# Patient Record
Sex: FEMALE | Race: BLACK OR AFRICAN AMERICAN
[De-identification: names, ages, dates, MRNs, and addresses within clinical notes are randomized per-mention and may not be internally consistent; named-entity substitution may affect disease eponyms.]

---

## 2017-05-19 ENCOUNTER — HOSPITAL ENCOUNTER (EMERGENCY)
Dept: HOSPITAL 61 - ER | Age: 27
Discharge: HOME | End: 2017-05-19
Payer: COMMERCIAL

## 2017-05-19 VITALS — BODY MASS INDEX: 21.97 KG/M2 | WEIGHT: 140 LBS | HEIGHT: 67 IN

## 2017-05-19 VITALS — DIASTOLIC BLOOD PRESSURE: 55 MMHG | SYSTOLIC BLOOD PRESSURE: 92 MMHG

## 2017-05-19 DIAGNOSIS — J45.909: ICD-10-CM

## 2017-05-19 DIAGNOSIS — Z91.041: ICD-10-CM

## 2017-05-19 DIAGNOSIS — R51: Primary | ICD-10-CM

## 2017-05-19 DIAGNOSIS — R20.2: ICD-10-CM

## 2017-05-19 PROCEDURE — 99284 EMERGENCY DEPT VISIT MOD MDM: CPT

## 2017-05-19 PROCEDURE — 81025 URINE PREGNANCY TEST: CPT

## 2017-05-19 PROCEDURE — 96374 THER/PROPH/DIAG INJ IV PUSH: CPT

## 2017-05-19 PROCEDURE — 96375 TX/PRO/DX INJ NEW DRUG ADDON: CPT

## 2017-05-19 NOTE — PHYS DOC
Past Medical History


Past Medical History:  Asthma


Past Surgical History:  Other


Additional Past Surgical Histo:  DNC FROM [facility] [place]


Alcohol Use:  Occasionally


Drug Use:  None





Adult General


Chief Complaint


Chief Complaint:  HEADACHE





HPI


HPI





Patient is a 26  year old female who presents with gradual onset left sided 

headache that is achy and has associated tingling and pain to left shoulder, 

neck and upper extremity.  States left upper extremity feels slightly better if 

held in flexion at elbow.  Symptoms are worse when she turns her head to the 

right or bends her neck to the right.  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 

symptoms began.  Denies recent trauma.  Denies vision changes, dizziness, chest 

pain, dyspnea, cough, f/c, n/v, abdominal pain, back pain, rash.





Review of Systems


Review of Systems





Constitutional: Denies fever or chills []


Eyes: Denies change in visual acuity, redness, or eye pain []


HENT: Denies nasal congestion or sore throat []


Respiratory: Denies cough or shortness of breath []


Cardiovascular: No additional information not addressed in HPI []


GI: Denies abdominal pain, nausea, vomiting, bloody stools or diarrhea []


: Denies dysuria or hematuria []


Musculoskeletal: Denies back pain or joint pain []


Integument: Denies rash or skin lesions []


Neurologic: Denies focal weakness []


Endocrine: Denies polyuria or polydipsia []





Current Medications


Current Medications





Current Medications








 Medications


  (Trade)  Dose


 Ordered  Sig/Luke  Start Time


 Stop Time Status Last Admin


Dose Admin


 


 Dexamethasone


 Sodium Phosphate


  (Decadron)  10 mg  1X  ONCE  5/19/17 22:30


 5/19/17 22:31 DC 5/19/17 22:41


10 MG


 


 Diphenhydramine


 HCl


  (Benadryl)  25 mg  1X  ONCE  5/19/17 22:30


 5/19/17 22:31 DC 5/19/17 22:42


25 MG


 


 Ketorolac


 Tromethamine


  (Toradol)  15 mg  1X  ONCE  5/19/17 22:30


 5/19/17 22:31 DC 5/19/17 22:42


15 MG


 


 Prochlorperazine


 Edisylate


  (Compazine)  10 mg  1X  ONCE  5/19/17 22:30


 5/19/17 22:31 DC 5/19/17 22:41


10 MG











Allergies


Allergies





Allergies








Coded Allergies Type Severity Reaction Last Updated Verified


 


  radium-223 dichloride Allergy Intermediate RASH 8/29/14 Yes











Physical Exam


Physical Exam





Constitutional: Well developed, well nourished, no acute distress, non-toxic 

appearance. []


HENT: Normocephalic, atraumatic, bilateral external ears normal, oropharynx 

moist, nose normal. []


Eyes: PERRLA, EOMI. [] 


Neck: Normal range of motion, supple. [] 


Cardiovascular:Heart rate regular rhythm []


Lungs & Thorax:  Bilateral breath sounds clear to auscultation []


Abdomen: Bowel sounds normal, soft, no tenderness. [] 


Skin: Warm, dry, no erythema, no rash. [] 


Back: No spinal tenderness, normal ROM; Has some left trapezius and thoracic 

paraspinal tenderness. [] 


Extremities: No tenderness, ROM intact, no edema, 2+ radial pulses bilaterally. 

[] 


Neurologic: Alert and oriented X 3, normal motor function, normal sensory 

function, no focal deficits noted. []


Psychologic: Affect normal, judgement normal, mood normal. []





Current Patient Data


Vital Signs





 Vital Signs








  Date Time  Temp Pulse Resp B/P (MAP) Pulse Ox O2 Delivery O2 Flow Rate FiO2


 


5/19/17 22:29 98.3 119 16 126/73 (90) 98 Room Air  





 98.3       








Lab Values





 Laboratory Tests








Test


  5/19/17


21:18


 


POC Urine HCG, Qualitative


  Hcg negative


(Negative)











Course & Med Decision Making


Course & Med Decision Making


Her symptoms are resolved after medications and she feels back to baseline. She 

would like to go home at this time. Return precautions given. She understands 

and agrees with plan.





Dragon Disclaimer


Dragon Disclaimer


This electronic medical record was generated, in whole or in part, using a 

voice recognition dictation system.





Departure


Departure


Impression:  


 Primary Impression:  


 Headache


 Additional Impression:  


 Tingling of left upper extremity


Disposition:  01 HOME, SELF-CARE


Condition:  STABLE


Referrals:  


ALEX BUSTILLO MD (PCP)


Patient Instructions:  Migraine Headache, Easy-to-Read





Additional Instructions:  


Your symptoms are concerning for migraine or radiculopathy. Take Tylenol or 

ibuprofen as needed for pain. Follow-up with your primary care doctor within 

the week. Return for any concerns.





Problem Qualifiers








 Primary Impression:  


 Headache


 Headache type:  unspecified  Headache chronicity pattern:  acute headache  

Intractability:  not intractable  Qualified Codes:  R51 - Headache








ASAF MCFARLAND MD May 19, 2017 22:45

## 2017-06-02 ENCOUNTER — HOSPITAL ENCOUNTER (EMERGENCY)
Dept: HOSPITAL 61 - ER | Age: 27
Discharge: HOME | End: 2017-06-02
Payer: COMMERCIAL

## 2017-06-02 VITALS — WEIGHT: 135 LBS | HEIGHT: 67 IN | BODY MASS INDEX: 21.19 KG/M2

## 2017-06-02 VITALS — DIASTOLIC BLOOD PRESSURE: 59 MMHG | SYSTOLIC BLOOD PRESSURE: 98 MMHG

## 2017-06-02 DIAGNOSIS — Z88.8: ICD-10-CM

## 2017-06-02 DIAGNOSIS — L03.116: Primary | ICD-10-CM

## 2017-06-02 DIAGNOSIS — F17.210: ICD-10-CM

## 2017-06-02 PROCEDURE — 99283 EMERGENCY DEPT VISIT LOW MDM: CPT

## 2017-06-02 NOTE — PHYS DOC
Past Medical History


Past Medical History:  No Pertinent History


Past Surgical History:  No Surgical History


Additional Past Surgical Histo:  COMPLICATIONS WITH VAGINAL BIRTH


Additional Information:  


3 CIGS/DAY


Alcohol Use:  None


Drug Use:  None





Adult General


Chief Complaint


Chief Complaint:  FOOT INJURY PAIN





HPI


HPI





Patient is a 26  year old female presenting to the emergency department for 

evaluation of left leg foot and calf pain that has been going on since 

yesterday after a foreign went into her left Achilles area.  I can see a small 

piece of retained foreign body in her left Achilles.  There is surrounding 

redness and swelling.  She says the pain radiates up and down the leg and it is 

painful to walk.  Her Achilles is intact and she is able to walk it is just 

painful to do so.  There is some tingling around the foreign body no weakness 

numbness or tingling distally.  She says that she thinks her tetanus status is 

up-to-date.  She says that she is not diabetic and she has no immune system 

issues.  She is in no obvious distress with normal vital signs.





Review of Systems


Review of Systems





Constitutional: Denies fever or chills []


GI: Denies abdominal pain, nausea, vomiting.


Musculoskeletal: Denies back pain or joint pain []


Integument: + redness.


Neurologic: Denies headache, focal weakness or sensory changes []





Allergies


Allergies





Allergies








Coded Allergies Type Severity Reaction Last Updated Verified


 


  radium-223 dichloride Allergy Intermediate RASH 8/29/14 Yes











Physical Exam


Physical Exam





Constitutional: Well developed, well nourished, no acute distress, non-toxic 

appearance. []


Cardiovascular:Heart rate regular rhythm, no murmur []


Lungs & Thorax:  Bilateral breath sounds clear to auscultation []


Skin: Warm, dry, no erythema, no rash. [] 


Extremities: Left Achilles area with small black appearing foreign body.  There 

is surrounding cellulitis approximately 2 x 2 centimeters with pain but no 

subcutaneous air in her calf and foot or wound area.  She has pain to palpation 

in her calf and foot as well but there is no redness.  There is mild swelling 

on the top of her left foot.  She is neurovascularly intact distally.


Neurologic: Alert and oriented X 3, normal motor function, normal sensory 

function, no focal deficits noted. []





Current Patient Data


Vital Signs





 Vital Signs








  Date Time  Temp Pulse Resp B/P (MAP) Pulse Ox O2 Delivery O2 Flow Rate FiO2


 


6/2/17 11:42 98.1 80 18  99 Room Air  





 98.1       











EKG


EKG


[]





Radiology/Procedures


Radiology/Procedures


[]





Course & Med Decision Making


Course & Med Decision Making


I recommended that she let me numb up the area of foreign body and extract any 

retained foreign body but she refused.  She said she rather see if it falls out 

on its own.  I told her that I will start her on antibiotics and she needs to 

have her wound rechecked on Monday and if it gets more red and swollen or 

painful she needs to come back to the emergency department.  Patient aware and 

agreeable with plan for discharge and verbalized understanding of the need for 

short-term follow-up and strict ER return precautions discussed as above.





Dragon Disclaimer


Dragon Disclaimer


This electronic medical record was generated, in whole or in part, using a 

voice recognition dictation system.





Departure


Departure


Impression:  


 Primary Impression:  


 Cellulitis of leg without foot


Disposition:  01 HOME, SELF-CARE


Condition:  GOOD


Referrals:  


ALEX BUSTILLO MD (PCP)


Patient Instructions:  Cellulitis





Additional Instructions:  


TAKE 400MG OF IBUPROFEN EVERY 6 HOURS AND THE NORCO FOR BREAKTHROUGH PAIN.  

FOLLOW WITH YOUR PCP MONDAY AND COME BACK TO THE ED SOONER WITH ANY NEW OR 

WORSENING PAIN, FEVERS, REDNESS, SWELLING, OR OTHER GENERAL CONCERNS.  THANK YOU

!


Scripts


Cephalexin (KEFLEX) 500 Mg Capsule


1 CAP PO BID, #14 CAP


   Prov: TIFFANIE MCCLELLAN DO         6/2/17 


Hydrocodone/Apap 5-325 (NORCO 5-325 TABLET) 1 Each Tablet


1 TAB PO PRN Q6HRS Y for PAIN, #20 TAB 0 Refills


   Prov: TIFFANIE MCCLELLAN DO         6/2/17 


Sulfamethoxazole/Trimethoprim (BACTRIM 400-80 MG TABLET) 1 Each Tablet


1 TAB PO BID, #14 TAB


   Prov: TIFFANIE MCCLELLAN DO         6/2/17











TIFFANIE MCCLELLAN DO Jun 2, 2017 12:21

## 2018-01-08 ENCOUNTER — HOSPITAL ENCOUNTER (EMERGENCY)
Dept: HOSPITAL 61 - ER | Age: 28
LOS: 1 days | Discharge: HOME | End: 2018-01-09
Payer: COMMERCIAL

## 2018-01-08 DIAGNOSIS — Z87.891: ICD-10-CM

## 2018-01-08 DIAGNOSIS — Z3A.01: ICD-10-CM

## 2018-01-08 DIAGNOSIS — O23.41: Primary | ICD-10-CM

## 2018-01-08 DIAGNOSIS — Z88.8: ICD-10-CM

## 2018-01-08 PROCEDURE — 84702 CHORIONIC GONADOTROPIN TEST: CPT

## 2018-01-08 PROCEDURE — 85025 COMPLETE CBC W/AUTO DIFF WBC: CPT

## 2018-01-08 PROCEDURE — 81025 URINE PREGNANCY TEST: CPT

## 2018-01-08 PROCEDURE — 99285 EMERGENCY DEPT VISIT HI MDM: CPT

## 2018-01-08 PROCEDURE — 87086 URINE CULTURE/COLONY COUNT: CPT

## 2018-01-08 PROCEDURE — 86901 BLOOD TYPING SEROLOGIC RH(D): CPT

## 2018-01-08 PROCEDURE — 81001 URINALYSIS AUTO W/SCOPE: CPT

## 2018-01-08 PROCEDURE — 76801 OB US < 14 WKS SINGLE FETUS: CPT

## 2018-01-08 PROCEDURE — 86900 BLOOD TYPING SEROLOGIC ABO: CPT

## 2018-01-08 PROCEDURE — 36415 COLL VENOUS BLD VENIPUNCTURE: CPT

## 2018-01-09 LAB
ADD MAN DIFF?: NO
BACTERIA,URINE: (no result) /HPF
BASO #: 0 X10^3/UL (ref 0–0.2)
BASO %: 1 % (ref 0–3)
BILIRUBIN,URINE: NEGATIVE
CLARITY,URINE: (no result)
COLOR,URINE: YELLOW
EOS #: 0.1 X10^3/UL (ref 0–0.7)
EOS %: 2 % (ref 0–3)
GLUCOSE,URINE: NEGATIVE MG/DL
HCG SERPL-ACNC: 7 X10^3/UL (ref 4–11)
HEMATOCRIT: 38 % (ref 36–47)
HEMOGLOBIN: 12.1 G/DL (ref 12–15.5)
LYMPH #: 3.4 X10^3/UL (ref 1–4.8)
LYMPH %: 49 % (ref 24–48)
MEAN CORPUSCULAR HEMOGLOBIN: 28 PG (ref 25–35)
MEAN CORPUSCULAR HGB CONC: 32 G/DL (ref 31–37)
MEAN CORPUSCULAR VOLUME: 88 FL (ref 79–100)
MONO #: 0.5 X10^3/UL (ref 0–1.1)
MONO %: 7 % (ref 0–9)
NEUT #: 2.9 X10^3UL (ref 1.8–7.7)
NEUT %: 42 % (ref 31–73)
NITRITE,URINE: POSITIVE
PH,URINE: 6.5
PLATELET COUNT: 252 X10^3/UL (ref 140–400)
PROTEIN,URINE: NEGATIVE MG/DL
RBC,URINE: (no result) /HPF (ref 0–2)
RED BLOOD COUNT: 4.31 X10^6/UL (ref 3.5–5.4)
RED CELL DISTRIBUTION WIDTH: 15.2 % (ref 11.5–14.5)
SPECIFIC GRAVITY,URINE: >=1.03
SQUAMOUS EPITHELIAL CELL,UR: (no result) /LPF
URINE HCG POC: (no result)
UROBILINOGEN,URINE: 1 MG/DL
WBC,URINE: (no result) /HPF (ref 0–4)

## 2018-01-09 RX ADMIN — NITROFURANTOIN (MONOHYDRATE/MACROCRYSTALS) 1 MG: 75; 25 CAPSULE ORAL at 02:00

## 2018-01-10 ENCOUNTER — HOSPITAL ENCOUNTER (EMERGENCY)
Dept: HOSPITAL 61 - ER | Age: 28
Discharge: HOME | End: 2018-01-10
Payer: COMMERCIAL

## 2018-01-10 DIAGNOSIS — Z88.8: ICD-10-CM

## 2018-01-10 DIAGNOSIS — Z3A.22: ICD-10-CM

## 2018-01-10 DIAGNOSIS — O20.0: Primary | ICD-10-CM

## 2018-01-10 LAB
BACTERIA,URINE: (no result) /HPF
BILIRUBIN,URINE: (no result)
CLARITY,URINE: (no result)
COLOR,URINE: (no result)
GLUCOSE,URINE: NEGATIVE MG/DL
NITRITE,URINE: NEGATIVE
PH,URINE: 6
PROTEIN,URINE: 30 MG/DL
RBC,URINE: >40 /HPF (ref 0–2)
SPECIFIC GRAVITY,URINE: 1.02
SQUAMOUS EPITHELIAL CELL,UR: (no result) /LPF
UROBILINOGEN,URINE: 1 MG/DL
WBC,URINE: (no result) /HPF (ref 0–4)

## 2018-01-10 PROCEDURE — 36415 COLL VENOUS BLD VENIPUNCTURE: CPT

## 2018-01-10 PROCEDURE — 81001 URINALYSIS AUTO W/SCOPE: CPT

## 2018-01-10 PROCEDURE — 87086 URINE CULTURE/COLONY COUNT: CPT

## 2018-01-10 PROCEDURE — 84702 CHORIONIC GONADOTROPIN TEST: CPT

## 2018-01-10 PROCEDURE — 99285 EMERGENCY DEPT VISIT HI MDM: CPT

## 2018-01-10 PROCEDURE — 76801 OB US < 14 WKS SINGLE FETUS: CPT

## 2018-04-05 ENCOUNTER — HOSPITAL ENCOUNTER (EMERGENCY)
Dept: HOSPITAL 61 - ER | Age: 28
Discharge: HOME | End: 2018-04-05
Payer: COMMERCIAL

## 2018-04-05 VITALS
SYSTOLIC BLOOD PRESSURE: 108 MMHG | DIASTOLIC BLOOD PRESSURE: 78 MMHG | DIASTOLIC BLOOD PRESSURE: 78 MMHG | SYSTOLIC BLOOD PRESSURE: 108 MMHG

## 2018-04-05 DIAGNOSIS — O26.891: ICD-10-CM

## 2018-04-05 DIAGNOSIS — R11.0: ICD-10-CM

## 2018-04-05 DIAGNOSIS — O21.9: Primary | ICD-10-CM

## 2018-04-05 DIAGNOSIS — Z91.041: ICD-10-CM

## 2018-04-05 LAB
ADD MAN DIFF?: NO
ALBUMIN SERPL-MCNC: 3.1 G/DL (ref 3.4–5)
ALP SERPL-CCNC: 41 U/L (ref 46–116)
ALT (SGPT): 17 U/L (ref 14–59)
AMORPHOUS SEDIMENT,UR: PRESENT /HPF
ANION GAP SERPL CALC-SCNC: 9 MMOL/L (ref 6–14)
AST SERPL-CCNC: 16 U/L (ref 15–37)
BACTERIA,URINE: 0 /HPF
BASO #: 0 X10^3/UL (ref 0–0.2)
BASO %: 0 % (ref 0–3)
BILIRUB DIRECT SERPL-MCNC: < 0.1 MG/DL (ref 0–0.2)
BILIRUBIN,URINE: NEGATIVE
BLOOD UREA NITROGEN: 6 MG/DL (ref 7–20)
CALCIUM: 8.7 MG/DL (ref 8.5–10.1)
CHLORIDE: 103 MMOL/L (ref 98–107)
CLARITY,URINE: (no result)
CO2 SERPL-SCNC: 26 MMOL/L (ref 21–32)
COLOR,URINE: YELLOW
CREAT SERPL-MCNC: 0.5 MG/DL (ref 0.6–1)
EOS #: 0.1 X10^3/UL (ref 0–0.7)
EOS %: 1 % (ref 0–3)
GFR SERPLBLD BASED ON 1.73 SQ M-ARVRAT: 176.5 ML/MIN
GLUCOSE SERPL-MCNC: 71 MG/DL (ref 70–99)
GLUCOSE,URINE: NEGATIVE MG/DL
HCG SERPL-ACNC: 5.9 X10^3/UL (ref 4–11)
HEMATOCRIT: 29.6 % (ref 36–47)
HEMOGLOBIN: 9.9 G/DL (ref 12–15.5)
LYMPH #: 1.8 X10^3/UL (ref 1–4.8)
LYMPH %: 31 % (ref 24–48)
MEAN CORPUSCULAR HEMOGLOBIN: 28 PG (ref 25–35)
MEAN CORPUSCULAR HGB CONC: 33 G/DL (ref 31–37)
MEAN CORPUSCULAR VOLUME: 85 FL (ref 79–100)
MONO #: 0.5 X10^3/UL (ref 0–1.1)
MONO %: 8 % (ref 0–9)
NEUT #: 3.6 X10^3UL (ref 1.8–7.7)
NEUT %: 60 % (ref 31–73)
NITRITE,URINE: NEGATIVE
PH,URINE: 7.5
PLATELET COUNT: 231 X10^3/UL (ref 140–400)
POTASSIUM SERPL-SCNC: 3.6 MMOL/L (ref 3.5–5.1)
PROTEIN,URINE: NEGATIVE MG/DL
RBC,URINE: 0 /HPF (ref 0–2)
RED BLOOD COUNT: 3.5 X10^6/UL (ref 3.5–5.4)
RED CELL DISTRIBUTION WIDTH: 16.2 % (ref 11.5–14.5)
SODIUM: 138 MMOL/L (ref 136–145)
SPECIFIC GRAVITY,URINE: 1.02
SQUAMOUS EPITHELIAL CELL,UR: (no result) /LPF
TOTAL BILIRUBIN: 0.4 MG/DL (ref 0.2–1)
TOTAL PROTEIN: 6.9 G/DL (ref 6.4–8.2)
URINE HCG POC: (no result)
UROBILINOGEN,URINE: 1 MG/DL
WBC,URINE: (no result) /HPF (ref 0–4)

## 2018-04-05 PROCEDURE — 76801 OB US < 14 WKS SINGLE FETUS: CPT

## 2018-04-05 PROCEDURE — 81001 URINALYSIS AUTO W/SCOPE: CPT

## 2018-04-05 PROCEDURE — 96361 HYDRATE IV INFUSION ADD-ON: CPT

## 2018-04-05 PROCEDURE — 36415 COLL VENOUS BLD VENIPUNCTURE: CPT

## 2018-04-05 PROCEDURE — 85025 COMPLETE CBC W/AUTO DIFF WBC: CPT

## 2018-04-05 PROCEDURE — 81025 URINE PREGNANCY TEST: CPT

## 2018-04-05 PROCEDURE — 80048 BASIC METABOLIC PNL TOTAL CA: CPT

## 2018-04-05 PROCEDURE — 80076 HEPATIC FUNCTION PANEL: CPT

## 2018-04-05 PROCEDURE — 96374 THER/PROPH/DIAG INJ IV PUSH: CPT

## 2018-04-05 PROCEDURE — 99285 EMERGENCY DEPT VISIT HI MDM: CPT

## 2018-04-05 RX ADMIN — ONDANSETRON 1 MG: 2 INJECTION INTRAMUSCULAR; INTRAVENOUS at 14:45

## 2018-04-05 RX ADMIN — BACITRACIN 1 MLS/HR: 5000 INJECTION, POWDER, FOR SOLUTION INTRAMUSCULAR at 14:30

## 2018-07-06 ENCOUNTER — HOSPITAL ENCOUNTER (OUTPATIENT)
Dept: HOSPITAL 61 - 3 SO LND | Age: 28
Setting detail: OBSERVATION
Discharge: HOME | End: 2018-07-06
Attending: SPECIALIST | Admitting: SPECIALIST
Payer: COMMERCIAL

## 2018-07-06 DIAGNOSIS — Z79.899: ICD-10-CM

## 2018-07-06 DIAGNOSIS — Z3A.25: ICD-10-CM

## 2018-07-06 LAB
ADD MAN DIFF?: NO
ALBUMIN SERPL-MCNC: 3 G/DL (ref 3.4–5)
ALBUMIN/GLOB SERPL: 0.8 {RATIO} (ref 1–1.7)
ALP SERPL-CCNC: 85 U/L (ref 46–116)
ALT (SGPT): 15 U/L (ref 14–59)
ANION GAP SERPL CALC-SCNC: 12 MMOL/L (ref 6–14)
AST SERPL-CCNC: 19 U/L (ref 15–37)
BACTERIA,URINE: (no result) /HPF
BASO #: 0 X10^3/UL (ref 0–0.2)
BASO %: 1 % (ref 0–3)
BILIRUBIN,URINE: (no result)
BLOOD UREA NITROGEN: 7 MG/DL (ref 7–20)
BUN/CREAT SERPL: 18 (ref 6–20)
CALCIUM: 8.2 MG/DL (ref 8.5–10.1)
CHLORIDE: 101 MMOL/L (ref 98–107)
CLARITY,URINE: (no result)
CO2 SERPL-SCNC: 23 MMOL/L (ref 21–32)
COLOR,URINE: (no result)
CREAT SERPL-MCNC: 0.4 MG/DL (ref 0.6–1)
EOS #: 0.2 X10^3/UL (ref 0–0.7)
EOS %: 3 % (ref 0–3)
GFR SERPLBLD BASED ON 1.73 SQ M-ARVRAT: 228.3 ML/MIN
GLOBULIN SER-MCNC: 3.6 G/DL (ref 2.2–3.8)
GLUCOSE SERPL-MCNC: 67 MG/DL (ref 70–99)
GLUCOSE,URINE: NEGATIVE MG/DL
HCG SERPL-ACNC: 6.5 X10^3/UL (ref 4–11)
HEMATOCRIT: 29.4 % (ref 36–47)
HEMOGLOBIN: 9.7 G/DL (ref 12–15.5)
LYMPH #: 1.7 X10^3/UL (ref 1–4.8)
LYMPH %: 25 % (ref 24–48)
MEAN CORPUSCULAR HEMOGLOBIN: 28 PG (ref 25–35)
MEAN CORPUSCULAR HGB CONC: 33 G/DL (ref 31–37)
MEAN CORPUSCULAR VOLUME: 83 FL (ref 79–100)
MONO #: 0.6 X10^3/UL (ref 0–1.1)
MONO %: 9 % (ref 0–9)
NEUT #: 4 X10^3UL (ref 1.8–7.7)
NEUT %: 62 % (ref 31–73)
NITRITE,URINE: NEGATIVE
PH,URINE: 6.5
PLATELET COUNT: 190 X10^3/UL (ref 140–400)
POTASSIUM SERPL-SCNC: 3.5 MMOL/L (ref 3.5–5.1)
PROTEIN,URINE: 30 MG/DL
RBC,URINE: (no result) /HPF (ref 0–2)
RED BLOOD COUNT: 3.53 X10^6/UL (ref 3.5–5.4)
RED CELL DISTRIBUTION WIDTH: 15.7 % (ref 11.5–14.5)
SODIUM: 136 MMOL/L (ref 136–145)
SPECIFIC GRAVITY,URINE: 1.02
SQUAMOUS EPITHELIAL CELL,UR: (no result) /LPF
TOTAL BILIRUBIN: 0.3 MG/DL (ref 0.2–1)
TOTAL PROTEIN: 6.6 G/DL (ref 6.4–8.2)
UROBILINOGEN,URINE: 1 MG/DL
WBC,URINE: (no result) /HPF (ref 0–4)

## 2018-07-06 PROCEDURE — 81001 URINALYSIS AUTO W/SCOPE: CPT

## 2018-07-06 PROCEDURE — 80053 COMPREHEN METABOLIC PANEL: CPT

## 2018-07-06 PROCEDURE — 85025 COMPLETE CBC W/AUTO DIFF WBC: CPT

## 2018-07-06 PROCEDURE — 96360 HYDRATION IV INFUSION INIT: CPT

## 2018-07-06 PROCEDURE — 36415 COLL VENOUS BLD VENIPUNCTURE: CPT

## 2018-07-06 PROCEDURE — 96361 HYDRATE IV INFUSION ADD-ON: CPT

## 2018-07-06 PROCEDURE — 87086 URINE CULTURE/COLONY COUNT: CPT

## 2018-07-06 RX ADMIN — DEXTROSE, SODIUM CHLORIDE, SODIUM LACTATE, POTASSIUM CHLORIDE, AND CALCIUM CHLORIDE 1 MLS/HR: 5; .6; .31; .03; .02 INJECTION, SOLUTION INTRAVENOUS at 19:26

## 2018-07-06 RX ADMIN — DEXTROSE, SODIUM CHLORIDE, SODIUM LACTATE, POTASSIUM CHLORIDE, AND CALCIUM CHLORIDE 1 MLS/HR: 5; .6; .31; .03; .02 INJECTION, SOLUTION INTRAVENOUS at 18:26

## 2018-07-21 ENCOUNTER — HOSPITAL ENCOUNTER (OUTPATIENT)
Dept: HOSPITAL 61 - 3 SO LND | Age: 28
Setting detail: OBSERVATION
Discharge: HOME | End: 2018-07-21
Attending: SPECIALIST | Admitting: SPECIALIST
Payer: COMMERCIAL

## 2018-07-21 DIAGNOSIS — R10.9: ICD-10-CM

## 2018-07-21 DIAGNOSIS — O26.892: Primary | ICD-10-CM

## 2018-07-21 DIAGNOSIS — Z3A.27: ICD-10-CM

## 2018-07-21 LAB
AMORPHOUS SEDIMENT,UR: PRESENT /HPF
BACTERIA,URINE: 0 /HPF
BILIRUBIN,URINE: NEGATIVE
CLARITY,URINE: (no result)
COLOR,URINE: YELLOW
GLUCOSE,URINE: NEGATIVE MG/DL
NITRITE,URINE: NEGATIVE
PH,URINE: 8
PROTEIN,URINE: NEGATIVE MG/DL
RBC,URINE: 0 /HPF (ref 0–2)
SPECIFIC GRAVITY,URINE: 1.01
SQUAMOUS EPITHELIAL CELL,UR: (no result) /LPF
UROBILINOGEN,URINE: 1 MG/DL
WBC,URINE: 0 /HPF (ref 0–4)

## 2018-07-21 PROCEDURE — 96361 HYDRATE IV INFUSION ADD-ON: CPT

## 2018-07-21 PROCEDURE — 96360 HYDRATION IV INFUSION INIT: CPT

## 2018-07-21 PROCEDURE — 59025 FETAL NON-STRESS TEST: CPT

## 2018-07-21 PROCEDURE — 81001 URINALYSIS AUTO W/SCOPE: CPT

## 2018-07-21 PROCEDURE — 87086 URINE CULTURE/COLONY COUNT: CPT

## 2018-07-21 RX ADMIN — DEXTROSE, SODIUM CHLORIDE, SODIUM LACTATE, POTASSIUM CHLORIDE, AND CALCIUM CHLORIDE 1 MLS/HR: 5; .6; .31; .03; .02 INJECTION, SOLUTION INTRAVENOUS at 09:54

## 2018-07-21 RX ADMIN — DEXTROSE, SODIUM CHLORIDE, SODIUM LACTATE, POTASSIUM CHLORIDE, AND CALCIUM CHLORIDE 1 MLS/HR: 5; .6; .31; .03; .02 INJECTION, SOLUTION INTRAVENOUS at 08:54

## 2018-08-10 ENCOUNTER — HOSPITAL ENCOUNTER (OUTPATIENT)
Dept: HOSPITAL 61 - 3 SO LND | Age: 28
Setting detail: OBSERVATION
Discharge: HOME | End: 2018-08-10
Attending: SPECIALIST | Admitting: SPECIALIST
Payer: COMMERCIAL

## 2018-08-10 DIAGNOSIS — Z3A.30: ICD-10-CM

## 2018-08-10 DIAGNOSIS — Z79.899: ICD-10-CM

## 2018-08-10 LAB
AMNIO PT: NEGATIVE
AMPHETAMINE/METHAMPHETAMINE: (no result)
APTT PPP: YELLOW S
BACTERIA #/AREA URNS HPF: 0 /HPF
BARBITURATES UR-MCNC: (no result) UG/ML
BENZODIAZ UR-MCNC: (no result) UG/L
BILIRUB UR QL STRIP: NEGATIVE
CANNABINOIDS UR-MCNC: (no result) UG/L
COCAINE UR-MCNC: (no result) NG/ML
FIBRINOGEN PPP-MCNC: CLEAR MG/DL
GRAN CASTS #/AREA URNS LPF: (no result) /HPF
METHADONE SERPL-MCNC: (no result) NG/ML
NITRITE UR QL STRIP: NEGATIVE
OPIATES UR-MCNC: (no result) NG/ML
PCP SERPL-MCNC: (no result) MG/DL
PH UR STRIP: 8 [PH]
PROT UR STRIP-MCNC: NEGATIVE MG/DL
RBC #/AREA URNS HPF: (no result) /HPF (ref 0–2)
SQUAMOUS #/AREA URNS LPF: (no result) /LPF
UROBILINOGEN UR-MCNC: 1 MG/DL
WBC #/AREA URNS HPF: (no result) /HPF (ref 0–4)

## 2018-08-10 PROCEDURE — G0379 DIRECT REFER HOSPITAL OBSERV: HCPCS

## 2018-08-10 PROCEDURE — 81001 URINALYSIS AUTO W/SCOPE: CPT

## 2018-08-10 PROCEDURE — 36415 COLL VENOUS BLD VENIPUNCTURE: CPT

## 2018-08-10 PROCEDURE — 87086 URINE CULTURE/COLONY COUNT: CPT

## 2018-08-10 PROCEDURE — 80307 DRUG TEST PRSMV CHEM ANLYZR: CPT

## 2018-08-10 PROCEDURE — G0378 HOSPITAL OBSERVATION PER HR: HCPCS

## 2018-08-10 PROCEDURE — G0479 DRUG TEST PRESUMP NOT OPT: HCPCS

## 2018-08-10 PROCEDURE — 96360 HYDRATION IV INFUSION INIT: CPT

## 2018-08-10 PROCEDURE — 84112 EVAL AMNIOTIC FLUID PROTEIN: CPT

## 2018-08-26 ENCOUNTER — HOSPITAL ENCOUNTER (OUTPATIENT)
Dept: HOSPITAL 61 - 3 SO LND | Age: 28
Setting detail: OBSERVATION
Discharge: HOME | End: 2018-08-26
Attending: SPECIALIST | Admitting: SPECIALIST
Payer: COMMERCIAL

## 2018-08-26 DIAGNOSIS — Z3A.41: ICD-10-CM

## 2018-08-26 DIAGNOSIS — O48.0: ICD-10-CM

## 2018-08-26 DIAGNOSIS — Z79.899: ICD-10-CM

## 2018-08-26 LAB
ALBUMIN SERPL-MCNC: 3 G/DL (ref 3.4–5)
ALBUMIN/GLOB SERPL: 0.8 {RATIO} (ref 1–1.7)
ALP SERPL-CCNC: 151 U/L (ref 46–116)
ALT SERPL-CCNC: 17 U/L (ref 14–59)
AMPHETAMINE/METHAMPHETAMINE: (no result)
ANION GAP SERPL CALC-SCNC: 7 MMOL/L (ref 6–14)
APTT PPP: YELLOW S
AST SERPL-CCNC: 18 U/L (ref 15–37)
BACTERIA #/AREA URNS HPF: (no result) /HPF
BARBITURATES UR-MCNC: (no result) UG/ML
BASOPHILS # BLD AUTO: 0.1 X10^3/UL (ref 0–0.2)
BASOPHILS NFR BLD: 1 % (ref 0–3)
BENZODIAZ UR-MCNC: (no result) UG/L
BILIRUB SERPL-MCNC: 0.6 MG/DL (ref 0.2–1)
BILIRUB UR QL STRIP: NEGATIVE
BUN SERPL-MCNC: 7 MG/DL (ref 7–20)
BUN/CREAT SERPL: 14 (ref 6–20)
CALCIUM SERPL-MCNC: 8.7 MG/DL (ref 8.5–10.1)
CANNABINOIDS UR-MCNC: (no result) UG/L
CHLORIDE SERPL-SCNC: 100 MMOL/L (ref 98–107)
CO2 SERPL-SCNC: 27 MMOL/L (ref 21–32)
COCAINE UR-MCNC: (no result) NG/ML
CREAT SERPL-MCNC: 0.5 MG/DL (ref 0.6–1)
EOSINOPHIL NFR BLD: 0.2 X10^3/UL (ref 0–0.7)
EOSINOPHIL NFR BLD: 2 % (ref 0–3)
ERYTHROCYTE [DISTWIDTH] IN BLOOD BY AUTOMATED COUNT: 16.8 % (ref 11.5–14.5)
FIBRINOGEN PPP-MCNC: CLEAR MG/DL
GFR SERPLBLD BASED ON 1.73 SQ M-ARVRAT: 176.5 ML/MIN
GLOBULIN SER-MCNC: 3.8 G/DL (ref 2.2–3.8)
GLUCOSE SERPL-MCNC: 96 MG/DL (ref 70–99)
HCT VFR BLD CALC: 27.1 % (ref 36–47)
HGB BLD-MCNC: 8.9 G/DL (ref 12–15.5)
LYMPHOCYTES # BLD: 2 X10^3/UL (ref 1–4.8)
LYMPHOCYTES NFR BLD AUTO: 26 % (ref 24–48)
MCH RBC QN AUTO: 26 PG (ref 25–35)
MCHC RBC AUTO-ENTMCNC: 33 G/DL (ref 31–37)
MCV RBC AUTO: 78 FL (ref 79–100)
METHADONE SERPL-MCNC: (no result) NG/ML
MONO #: 0.7 X10^3/UL (ref 0–1.1)
MONOCYTES NFR BLD: 9 % (ref 0–9)
NEUT #: 4.8 X10^3UL (ref 1.8–7.7)
NEUTROPHILS NFR BLD AUTO: 62 % (ref 31–73)
NITRITE UR QL STRIP: NEGATIVE
OPIATES UR-MCNC: (no result) NG/ML
PCP SERPL-MCNC: (no result) MG/DL
PH UR STRIP: 7 [PH]
PLATELET # BLD AUTO: 186 X10^3/UL (ref 140–400)
POTASSIUM SERPL-SCNC: 3.8 MMOL/L (ref 3.5–5.1)
PROT SERPL-MCNC: 6.8 G/DL (ref 6.4–8.2)
PROT UR STRIP-MCNC: NEGATIVE MG/DL
RBC # BLD AUTO: 3.47 X10^6/UL (ref 3.5–5.4)
RBC #/AREA URNS HPF: 0 /HPF (ref 0–2)
SODIUM SERPL-SCNC: 134 MMOL/L (ref 136–145)
SQUAMOUS #/AREA URNS LPF: (no result) /LPF
UROBILINOGEN UR-MCNC: 1 MG/DL
WBC # BLD AUTO: 7.8 X10^3/UL (ref 4–11)
WBC #/AREA URNS HPF: (no result) /HPF (ref 0–4)

## 2018-08-26 PROCEDURE — 36415 COLL VENOUS BLD VENIPUNCTURE: CPT

## 2018-08-26 PROCEDURE — 84443 ASSAY THYROID STIM HORMONE: CPT

## 2018-08-26 PROCEDURE — G0479 DRUG TEST PRESUMP NOT OPT: HCPCS

## 2018-08-26 PROCEDURE — G0379 DIRECT REFER HOSPITAL OBSERV: HCPCS

## 2018-08-26 PROCEDURE — 83036 HEMOGLOBIN GLYCOSYLATED A1C: CPT

## 2018-08-26 PROCEDURE — 81001 URINALYSIS AUTO W/SCOPE: CPT

## 2018-08-26 PROCEDURE — 80307 DRUG TEST PRSMV CHEM ANLYZR: CPT

## 2018-08-26 PROCEDURE — 84550 ASSAY OF BLOOD/URIC ACID: CPT

## 2018-08-26 PROCEDURE — 85025 COMPLETE CBC W/AUTO DIFF WBC: CPT

## 2018-08-26 PROCEDURE — G0378 HOSPITAL OBSERVATION PER HR: HCPCS

## 2018-08-26 PROCEDURE — 80053 COMPREHEN METABOLIC PANEL: CPT

## 2018-08-28 LAB — HBA1C MFR BLD: 5 % (ref 4.8–5.6)

## 2019-03-05 ENCOUNTER — HOSPITAL ENCOUNTER (EMERGENCY)
Dept: HOSPITAL 61 - ER | Age: 29
Discharge: HOME | End: 2019-03-05
Payer: COMMERCIAL

## 2019-03-05 VITALS
DIASTOLIC BLOOD PRESSURE: 59 MMHG | SYSTOLIC BLOOD PRESSURE: 90 MMHG | DIASTOLIC BLOOD PRESSURE: 59 MMHG | SYSTOLIC BLOOD PRESSURE: 90 MMHG

## 2019-03-05 VITALS — BODY MASS INDEX: 21.19 KG/M2 | HEIGHT: 67 IN | WEIGHT: 135 LBS

## 2019-03-05 DIAGNOSIS — F17.210: ICD-10-CM

## 2019-03-05 DIAGNOSIS — R42: Primary | ICD-10-CM

## 2019-03-05 DIAGNOSIS — Z91.041: ICD-10-CM

## 2019-03-05 DIAGNOSIS — D72.819: ICD-10-CM

## 2019-03-05 DIAGNOSIS — F14.10: ICD-10-CM

## 2019-03-05 DIAGNOSIS — Z71.6: ICD-10-CM

## 2019-03-05 DIAGNOSIS — D64.9: ICD-10-CM

## 2019-03-05 DIAGNOSIS — Z72.0: ICD-10-CM

## 2019-03-05 LAB
ALBUMIN SERPL-MCNC: 3.5 G/DL (ref 3.4–5)
ALBUMIN/GLOB SERPL: 1 {RATIO} (ref 1–1.7)
ALP SERPL-CCNC: 64 U/L (ref 46–116)
ALT SERPL-CCNC: 25 U/L (ref 14–59)
AMORPH SED URNS QL MICRO: PRESENT /HPF
AMPHETAMINE/METHAMPHETAMINE: (no result)
ANION GAP SERPL CALC-SCNC: 8 MMOL/L (ref 6–14)
APTT PPP: YELLOW S
AST SERPL-CCNC: 20 U/L (ref 15–37)
BACTERIA #/AREA URNS HPF: (no result) /HPF
BARBITURATES UR-MCNC: (no result) UG/ML
BASOPHILS # BLD AUTO: 0 X10^3/UL (ref 0–0.2)
BASOPHILS NFR BLD: 1 % (ref 0–3)
BENZODIAZ UR-MCNC: (no result) UG/L
BILIRUB SERPL-MCNC: 0.4 MG/DL (ref 0.2–1)
BILIRUB UR QL STRIP: NEGATIVE
BUN SERPL-MCNC: 9 MG/DL (ref 7–20)
BUN/CREAT SERPL: 11 (ref 6–20)
CALCIUM SERPL-MCNC: 9 MG/DL (ref 8.5–10.1)
CANNABINOIDS UR-MCNC: (no result) UG/L
CHLORIDE SERPL-SCNC: 101 MMOL/L (ref 98–107)
CO2 SERPL-SCNC: 33 MMOL/L (ref 21–32)
COCAINE UR-MCNC: (no result) NG/ML
CREAT SERPL-MCNC: 0.8 MG/DL (ref 0.6–1)
EOSINOPHIL NFR BLD: 0.1 X10^3/UL (ref 0–0.7)
EOSINOPHIL NFR BLD: 4 % (ref 0–3)
ERYTHROCYTE [DISTWIDTH] IN BLOOD BY AUTOMATED COUNT: 17.8 % (ref 11.5–14.5)
FIBRINOGEN PPP-MCNC: (no result) MG/DL
GFR SERPLBLD BASED ON 1.73 SQ M-ARVRAT: 101.9 ML/MIN
GLOBULIN SER-MCNC: 3.6 G/DL (ref 2.2–3.8)
GLUCOSE SERPL-MCNC: 87 MG/DL (ref 70–99)
HCT VFR BLD CALC: 34.7 % (ref 36–47)
HGB BLD-MCNC: 11 G/DL (ref 12–15.5)
LYMPHOCYTES # BLD: 1.6 X10^3/UL (ref 1–4.8)
LYMPHOCYTES NFR BLD AUTO: 42 % (ref 24–48)
MAGNESIUM SERPL-MCNC: 1.9 MG/DL (ref 1.8–2.4)
MCH RBC QN AUTO: 25 PG (ref 25–35)
MCHC RBC AUTO-ENTMCNC: 32 G/DL (ref 31–37)
MCV RBC AUTO: 79 FL (ref 79–100)
METHADONE SERPL-MCNC: (no result) NG/ML
MONO #: 0.4 X10^3/UL (ref 0–1.1)
MONOCYTES NFR BLD: 11 % (ref 0–9)
NEUT #: 1.7 X10^3UL (ref 1.8–7.7)
NEUTROPHILS NFR BLD AUTO: 43 % (ref 31–73)
NITRITE UR QL STRIP: NEGATIVE
OPIATES UR-MCNC: (no result) NG/ML
PCP SERPL-MCNC: (no result) MG/DL
PH UR STRIP: 8.5 [PH]
PLATELET # BLD AUTO: 277 X10^3/UL (ref 140–400)
POTASSIUM SERPL-SCNC: 4 MMOL/L (ref 3.5–5.1)
PROT SERPL-MCNC: 7.1 G/DL (ref 6.4–8.2)
PROT UR STRIP-MCNC: 30 MG/DL
RBC # BLD AUTO: 4.38 X10^6/UL (ref 3.5–5.4)
RBC #/AREA URNS HPF: (no result) /HPF (ref 0–2)
SODIUM SERPL-SCNC: 142 MMOL/L (ref 136–145)
SQUAMOUS #/AREA URNS LPF: (no result) /LPF
UROBILINOGEN UR-MCNC: 1 MG/DL
WBC # BLD AUTO: 3.9 X10^3/UL (ref 4–11)
WBC #/AREA URNS HPF: (no result) /HPF (ref 0–4)

## 2019-03-05 PROCEDURE — 96360 HYDRATION IV INFUSION INIT: CPT

## 2019-03-05 PROCEDURE — 86901 BLOOD TYPING SEROLOGIC RH(D): CPT

## 2019-03-05 PROCEDURE — 86900 BLOOD TYPING SEROLOGIC ABO: CPT

## 2019-03-05 PROCEDURE — 70450 CT HEAD/BRAIN W/O DYE: CPT

## 2019-03-05 PROCEDURE — 81025 URINE PREGNANCY TEST: CPT

## 2019-03-05 PROCEDURE — 80307 DRUG TEST PRSMV CHEM ANLYZR: CPT

## 2019-03-05 PROCEDURE — 85025 COMPLETE CBC W/AUTO DIFF WBC: CPT

## 2019-03-05 PROCEDURE — 36415 COLL VENOUS BLD VENIPUNCTURE: CPT

## 2019-03-05 PROCEDURE — 93005 ELECTROCARDIOGRAM TRACING: CPT

## 2019-03-05 PROCEDURE — 86850 RBC ANTIBODY SCREEN: CPT

## 2019-03-05 PROCEDURE — 83735 ASSAY OF MAGNESIUM: CPT

## 2019-03-05 PROCEDURE — 84484 ASSAY OF TROPONIN QUANT: CPT

## 2019-03-05 PROCEDURE — 99284 EMERGENCY DEPT VISIT MOD MDM: CPT

## 2019-03-05 PROCEDURE — 80053 COMPREHEN METABOLIC PANEL: CPT

## 2019-03-05 PROCEDURE — 81001 URINALYSIS AUTO W/SCOPE: CPT

## 2019-03-05 NOTE — EKG
[facility]

              [address] [place] [address]

Test Date:    2019               Test Time:    10:43:35

Pat Name:     SHANA SAAVEDRA        Department:   

Patient ID:   [id number]           Room:          

Gender:       F                        Technician:   

:          1990               Requested By: MARYCARMEN SAMANO

Order Number: [id number]           Reading MD:   Teofilo Reed

                                 Measurements

Intervals                              Axis          

Rate:         65                       P:            63

OR:           166                      QRS:          66

QRSD:         78                       T:            67

QT:           422                                    

QTc:          444                                    

                           Interpretive Statements

SINUS RHYTHM



Electronically Signed On 3-5-2019 11:10:44 CST by Teofilo Reed

## 2019-03-05 NOTE — PHYS DOC
Past Medical History


Past Medical History:  No Pertinent History


Past Surgical History:  No Surgical History


Additional Past Surgical Histo:  COMPLICATIONS WITH VAGINAL BIRTH; stillborn at 

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


Additional Information:  


2-3 ciggerates per day


Alcohol Use:  None


Drug Use:  None





Adult General


Chief Complaint


Chief Complaint:  DIZZY/LIGHT HEADED





HPI


HPI





Patient is a 28  year old female who presents with complaining of dizziness for 

2 weeks as a constant problem without change after movement of her head or a 

standing up. Patient complaining of  nausea and a couple episodes of vomiting 2 

days ago and generalized weakness without chest pain, shortness of breath, 

focal neuro deficit, fever and chills, heavy vaginal bleeding, head injury. 

Patient complaining of episodes of blurred vision. Patient states she had 

history of of dizziness and had blood transfusion previously. Patient also 

states she was admitted at [facility] because of anemia and 

leukopenia with unremarkable finding including negative HIV and hepatitis test. 

She admitted to smoke cigarettes and denies using drugs and alcohol.





Review of Systems


Review of Systems





Constitutional: Denies fever or chills []


Eyes: Denies change in visual acuity, redness, or eye pain []


HENT: Denies nasal congestion or sore throat []


Respiratory: Denies cough or shortness of breath []


Cardiovascular: No additional information not addressed in HPI []


GI: Denies abdominal pain, bloody stools or diarrhea, reports nausea and 

vomiting []


: Denies dysuria or hematuria []


Musculoskeletal: Denies back pain or joint pain []


Integument: Denies rash or skin lesions []


Neurologic: Denies headache, focal weakness or sensory changes reports 

dizziness []


Endocrine: Denies polyuria or polydipsia []





All other systems were reviewed and found to be within normal limits, except as 

documented in this note.





Current Medications


Current Medications





Current Medications








 Medications


  (Trade)  Dose


 Ordered  Sig/Luke  Start Time


 Stop Time Status Last Admin


Dose Admin


 


 Sodium Chloride  1,000 ml @ 


 1,000 mls/hr  1X  ONCE  3/5/19 10:30


 3/5/19 11:29 DC 3/5/19 10:51


1,000 MLS/HR











Allergies


Allergies





Allergies








Coded Allergies Type Severity Reaction Last Updated Verified


 


  radium-223 dichloride Allergy Intermediate RASH 14 Yes











Physical Exam


Physical Exam





Constitutional: Well developed, well nourished, mild distress, non-toxic 

appearance. []


HENT: Normocephalic, atraumatic, bilateral external ears normal, oropharynx 

moist, no oral exudates, nose normal. []


Eyes: PERRLA, EOMI, conjunctiva normal, no discharge. [] 


Neck: Normal range of motion, no tenderness, supple, no stridor. [] 


Cardiovascular:Heart rate regular rhythm, no murmur []


Lungs & Thorax:  Bilateral breath sounds clear to auscultation []


Abdomen: Bowel sounds normal, soft, no tenderness, no masses, no pulsatile 

masses. [] 


Skin: Warm, dry, no erythema, no rash. [] 


Back: No tenderness, no CVA tenderness. [] 


Extremities: No tenderness, no cyanosis, no clubbing, ROM intact, no edema. [] 


Neurologic: Alert and oriented X 3, normal motor function, normal sensory 

function, no focal deficits noted. []


Psychologic: Affect normal, judgement normal, mood normal. []





Current Patient Data


Vital Signs





 Vital Signs








  Date Time  Temp Pulse Resp B/P (MAP) Pulse Ox O2 Delivery O2 Flow Rate FiO2


 


3/5/19 14:09  61 18  100   


 


3/5/19 10:15 97.9   121/71 (88)  Room Air  





 97.9       








Lab Values





 Laboratory Tests








Test


 3/5/19


10:30 3/5/19


12:15 3/5/19


12:24


 


White Blood Count


 3.9 x10^3/uL


(4.0-11.0)  L 


 





 


Red Blood Count


 4.38 x10^6/uL


(3.50-5.40) 


 





 


Hemoglobin


 11.0 g/dL


(12.0-15.5)  L 


 





 


Hematocrit


 34.7 %


(36.0-47.0)  L 


 





 


Mean Corpuscular Volume


 79 fL ()


 


 





 


Mean Corpuscular Hemoglobin 25 pg (25-35)    


 


Mean Corpuscular Hemoglobin


Concent 32 g/dL


(31-37) 


 





 


Red Cell Distribution Width


 17.8 %


(11.5-14.5)  H 


 





 


Platelet Count


 277 x10^3/uL


(140-400) 


 





 


Neutrophils (%) (Auto) 43 % (31-73)    


 


Lymphocytes (%) (Auto) 42 % (24-48)    


 


Monocytes (%) (Auto) 11 % (0-9)  H  


 


Eosinophils (%) (Auto) 4 % (0-3)  H  


 


Basophils (%) (Auto) 1 % (0-3)    


 


Neutrophils # (Auto)


 1.7 x10^3uL


(1.8-7.7)  L 


 





 


Lymphocytes # (Auto)


 1.6 x10^3/uL


(1.0-4.8) 


 





 


Monocytes # (Auto)


 0.4 x10^3/uL


(0.0-1.1) 


 





 


Eosinophils # (Auto)


 0.1 x10^3/uL


(0.0-0.7) 


 





 


Basophils # (Auto)


 0.0 x10^3/uL


(0.0-0.2) 


 





 


Sodium Level


 142 mmol/L


(136-145) 


 





 


Potassium Level


 4.0 mmol/L


(3.5-5.1) 


 





 


Chloride Level


 101 mmol/L


() 


 





 


Carbon Dioxide Level


 33 mmol/L


(21-32)  H 


 





 


Anion Gap 8 (6-14)    


 


Blood Urea Nitrogen


 9 mg/dL (7-20)


 


 





 


Creatinine


 0.8 mg/dL


(0.6-1.0) 


 





 


Estimated GFR


(Cockcroft-Gault) 101.9  


 


 





 


BUN/Creatinine Ratio 11 (6-20)    


 


Glucose Level


 87 mg/dL


(70-99) 


 





 


Calcium Level


 9.0 mg/dL


(8.5-10.1) 


 





 


Magnesium Level


 1.9 mg/dL


(1.8-2.4) 


 





 


Total Bilirubin


 0.4 mg/dL


(0.2-1.0) 


 





 


Aspartate Amino Transferase


(AST) 20 U/L (15-37)


 


 





 


Alanine Aminotransferase (ALT)


 25 U/L (14-59)


 


 





 


Alkaline Phosphatase


 64 U/L


() 


 





 


Troponin I Quantitative


 < 0.017 ng/mL


(0.000-0.055) 


 





 


Total Protein


 7.1 g/dL


(6.4-8.2) 


 





 


Albumin


 3.5 g/dL


(3.4-5.0) 


 





 


Albumin/Globulin Ratio 1.0 (1.0-1.7)    


 


Urine Collection Type  Unknown   


 


Urine Color  Yellow   


 


Urine Clarity  Cloudy   


 


Urine pH  8.5   


 


Urine Specific Gravity  1.020   


 


Urine Protein


 


 30 mg/dL


(NEG-TRACE) 





 


Urine Glucose (UA)


 


 Negative mg/dL


(NEG) 





 


Urine Ketones (Stick)


 


 Negative mg/dL


(NEG) 





 


Urine Blood


 


 Negative (NEG)


 





 


Urine Nitrite


 


 Negative (NEG)


 





 


Urine Bilirubin


 


 Negative (NEG)


 





 


Urine Urobilinogen Dipstick


 


 1.0 mg/dL (0.2


mg/dL) 





 


Urine Leukocyte Esterase


 


 Negative (NEG)


 





 


Urine RBC


 


 Occ /HPF (0-2)


 





 


Urine WBC


 


 Occ /HPF (0-4)


 





 


Urine Squamous Epithelial


Cells 


 Many /LPF  


 





 


Urine Amorphous Sediment  Present /HPF   


 


Urine Bacteria


 


 Few /HPF


(0-FEW) 





 


Urine Mucus  Slight /LPF   


 


Urine Opiates Screen  Neg (NEG)   


 


Urine Methadone Screen  Neg (NEG)   


 


Urine Barbiturates  Neg (NEG)   


 


Urine Phencyclidine Screen  Neg (NEG)   


 


Urine


Amphetamine/Methamphetamine 


 Neg (NEG)  


 





 


Urine Benzodiazepines Screen  Neg (NEG)   


 


Urine Cocaine Screen  Pos (NEG)   


 


Urine Cannabinoids Screen  Neg (NEG)   


 


Urine Ethyl Alcohol  Neg (NEG)   


 


POC Urine HCG, Qualitative


 


 


 Hcg negative


(Negative)





 Laboratory Tests


3/5/19 10:30








 Laboratory Tests


3/5/19 10:30














EKG


EKG


EKG interpreted by me. EKG at 1042 showed normal sinus rhythm at rate of 65, no 

acute ST and T-wave abnormalities.





Radiology/Procedures


Radiology/Procedures


 [facility]


 [address] [place] [address]


 [phone]


 


 IMAGING REPORT





 Signed





PATIENT: SHANA SAAVEDRA ACCOUNT: [id number] MRN#: [id number]


: 1990 LOCATION: ER AGE: 28


SEX: F EXAM DT: 19 ACCESSION#: [id number]


STATUS: REG ER ORD. PHYSICIAN: MARYCARMEN SAMANO MD 


REASON: dizziness  WAITING ON HCG RESULTS 11:30 AM


PROCEDURE: CT HEAD WO CONTRAST





Examination: CT HEAD WO CONTRAST


 


History: DIZZINESS


 


Comparison/Correlation: 2016 CT head mass effect without contrast


 


Findings: Topograms unremarkable. Axial images of the head were obtained 


without contrast. Ventricles are normal size. No intracranial hemorrhage, 


midline shift, or mass effect. There is minimal calcification in the high 


right parafalcine region which is increase in size since the prior exam. 


No associated mass suspected.


 


The cerebellopontine angle is unremarkable. Bony structures are grossly 


unremarkable.


 


 


Impression:


No acute process.


 


 


PQRS Compliance Statement:


 


One or more of the following individualized dose reduction techniques were


utilized for this examination:  


1. Automated exposure control  


2. Adjustment of the mA and/or kV according to patient size  


3. Use of iterative reconstruction technique


 


Electronically signed by: rAiel Gurrola MD (3/5/2019 12:57 PM) [id number]














DICTATED and SIGNED BY:     ARIEL GURROLA MD


DATE:     19 1253





Course & Med Decision Making


Course & Med Decision Making


Pertinent Labs and Imaging studies reviewed. (See chart for details)





Evaluation of patient in ER showed 28-year-old female patient with complaining 

of constant dizziness for 2 weeks and nausea and couple episodes of vomiting 

for the last couple days. Patient had unremarkable physical exam. Hemoglobin 

was 11.0 and white count was 2.9. Patient had previous anemia with blood 

transfusion and leukopenia with unremarkable workup. Patient ambulated in ER 

without problem. Patient had positive UDS for cocaine and instructed to stop 

taking cocaine and smoking cigarettes and increase fluid intake and follow up 

with her primary care physician.





Dragon Disclaimer


Dragon Disclaimer


This electronic medical record was generated, in whole or in part, using a 

voice recognition dictation system.





Departure


Departure


Impression:  


 Primary Impression:  


 Dizziness


 Additional Impressions:  


 Cocaine abuse


 Anemia


 Leukopenia


 Tobacco abuse


 Tobacco abuse counseling


Disposition:   HOME, SELF-CARE (@1341)


Condition:  STABLE


Referrals:  


NO PCP (PCP)


Patient Instructions:  Benign Positional Vertigo, Cocaine Abuse-Brief, Smoking 

Cessation, Tips For Success





Additional Instructions:  


Drink plenty of liquids


Follow-up with your primary care physician in 3-5 days


Return to ER if not getting better


Scripts


Meclizine Hcl (MECLIZINE HCL) 25 Mg Tablet


25 MG PO PRN TID PRN for dizziness, #30


   dizziness


   Prov: MARYCARMEN SAMANO MD         3/5/19





Problem Qualifiers











MARYCARMEN SAMANO MD Mar 5, 2019 13:43

## 2019-03-05 NOTE — RAD
Examination: CT HEAD WO CONTRAST

 

History: DIZZINESS

 

Comparison/Correlation: 9/20/2016 CT head mass effect without contrast

 

Findings: Topograms unremarkable. Axial images of the head were obtained 

without contrast. Ventricles are normal size. No intracranial hemorrhage, 

midline shift, or mass effect. There is minimal calcification in the high 

right parafalcine region which is increase in size since the prior exam. 

No associated mass suspected.

 

The cerebellopontine angle is unremarkable. Bony structures are grossly 

unremarkable.

 

 

Impression:

No acute process.

 

 

PQRS Compliance Statement:

 

One or more of the following individualized dose reduction techniques were

utilized for this examination:  

1. Automated exposure control  

2. Adjustment of the mA and/or kV according to patient size  

3. Use of iterative reconstruction technique

 

Electronically signed by: Ariel Shipman MD (3/5/2019 12:57 PM) [id number]

## 2020-01-14 ENCOUNTER — HOSPITAL ENCOUNTER (EMERGENCY)
Dept: HOSPITAL 61 - ER | Age: 30
Discharge: LEFT BEFORE DISCHARGE | End: 2020-01-14
Payer: COMMERCIAL

## 2020-01-14 DIAGNOSIS — O46.91: Primary | ICD-10-CM

## 2020-01-14 DIAGNOSIS — Z53.21: ICD-10-CM

## 2020-01-14 DIAGNOSIS — Z3A.01: ICD-10-CM

## 2020-03-03 ENCOUNTER — HOSPITAL ENCOUNTER (EMERGENCY)
Dept: HOSPITAL 61 - ER | Age: 30
Discharge: HOME | End: 2020-03-03
Payer: SELF-PAY

## 2020-03-03 VITALS — WEIGHT: 140.21 LBS | BODY MASS INDEX: 22.01 KG/M2 | HEIGHT: 67 IN

## 2020-03-03 VITALS — SYSTOLIC BLOOD PRESSURE: 113 MMHG | DIASTOLIC BLOOD PRESSURE: 63 MMHG

## 2020-03-03 DIAGNOSIS — F17.200: ICD-10-CM

## 2020-03-03 DIAGNOSIS — Z88.8: ICD-10-CM

## 2020-03-03 DIAGNOSIS — Z98.890: ICD-10-CM

## 2020-03-03 DIAGNOSIS — R11.2: ICD-10-CM

## 2020-03-03 DIAGNOSIS — R10.30: Primary | ICD-10-CM

## 2020-03-03 LAB
ALBUMIN SERPL-MCNC: 3.6 G/DL (ref 3.4–5)
ALBUMIN/GLOB SERPL: 1.1 {RATIO} (ref 1–1.7)
ALP SERPL-CCNC: 53 U/L (ref 46–116)
ALT SERPL-CCNC: 21 U/L (ref 14–59)
AMORPH SED URNS QL MICRO: PRESENT /HPF
ANION GAP SERPL CALC-SCNC: 8 MMOL/L (ref 6–14)
ANISOCYTOSIS BLD QL SMEAR: (no result)
APTT PPP: YELLOW S
AST SERPL-CCNC: 20 U/L (ref 15–37)
BACTERIA #/AREA URNS HPF: (no result) /HPF
BASOPHILS # BLD AUTO: 0 X10^3/UL (ref 0–0.2)
BASOPHILS NFR BLD: 0 % (ref 0–3)
BILIRUB SERPL-MCNC: 0.1 MG/DL (ref 0.2–1)
BILIRUB UR QL STRIP: NEGATIVE
BUN SERPL-MCNC: 8 MG/DL (ref 7–20)
BUN/CREAT SERPL: 11 (ref 6–20)
CALCIUM SERPL-MCNC: 8.6 MG/DL (ref 8.5–10.1)
CHLORIDE SERPL-SCNC: 106 MMOL/L (ref 98–107)
CO2 SERPL-SCNC: 28 MMOL/L (ref 21–32)
CREAT SERPL-MCNC: 0.7 MG/DL (ref 0.6–1)
EOSINOPHIL NFR BLD: 0 X10^3/UL (ref 0–0.7)
EOSINOPHIL NFR BLD: 1 % (ref 0–3)
ERYTHROCYTE [DISTWIDTH] IN BLOOD BY AUTOMATED COUNT: 22.2 % (ref 11.5–14.5)
FIBRINOGEN PPP-MCNC: (no result) MG/DL
GFR SERPLBLD BASED ON 1.73 SQ M-ARVRAT: 118.1 ML/MIN
GLOBULIN SER-MCNC: 3.4 G/DL (ref 2.2–3.8)
GLUCOSE SERPL-MCNC: 105 MG/DL (ref 70–99)
HCT VFR BLD CALC: 26.8 % (ref 36–47)
HGB BLD-MCNC: 8.2 G/DL (ref 12–15.5)
HYPOCHROMIA BLD QL SMEAR: (no result)
LIPASE: 79 U/L (ref 73–393)
LYMPHOCYTES # BLD: 2.2 X10^3/UL (ref 1–4.8)
LYMPHOCYTES NFR BLD AUTO: 26 % (ref 24–48)
MCH RBC QN AUTO: 22 PG (ref 25–35)
MCHC RBC AUTO-ENTMCNC: 31 G/DL (ref 31–37)
MCV RBC AUTO: 70 FL (ref 79–100)
MICROCYTES BLD QL SMEAR: (no result)
MONO #: 0.6 X10^3/UL (ref 0–1.1)
MONOCYTES NFR BLD: 7 % (ref 0–9)
NEUT #: 5.6 X10^3/UL (ref 1.8–7.7)
NEUTROPHILS NFR BLD AUTO: 66 % (ref 31–73)
NITRITE UR QL STRIP: NEGATIVE
OVALOCYTES BLD QL SMEAR: (no result)
PH UR STRIP: 7.5 [PH]
PLATELET # BLD AUTO: 229 X10^3/UL (ref 140–400)
PLATELET # BLD EST: ADEQUATE 10*3/UL
POIKILOCYTOSIS BLD QL SMEAR: SLIGHT
POLYCHROMASIA BLD QL SMEAR: SLIGHT
POTASSIUM SERPL-SCNC: 3.4 MMOL/L (ref 3.5–5.1)
PROT SERPL-MCNC: 7 G/DL (ref 6.4–8.2)
PROT UR STRIP-MCNC: NEGATIVE MG/DL
RBC # BLD AUTO: 3.83 X10^6/UL (ref 3.5–5.4)
RBC #/AREA URNS HPF: 0 /HPF (ref 0–2)
SODIUM SERPL-SCNC: 142 MMOL/L (ref 136–145)
SQUAMOUS #/AREA URNS LPF: (no result) /LPF
TARGETS BLD QL SMEAR: (no result)
UROBILINOGEN UR-MCNC: 1 MG/DL
WBC # BLD AUTO: 8.4 X10^3/UL (ref 4–11)
WBC #/AREA URNS HPF: (no result) /HPF (ref 0–4)

## 2020-03-03 PROCEDURE — 81025 URINE PREGNANCY TEST: CPT

## 2020-03-03 PROCEDURE — 83690 ASSAY OF LIPASE: CPT

## 2020-03-03 PROCEDURE — 81001 URINALYSIS AUTO W/SCOPE: CPT

## 2020-03-03 PROCEDURE — 99285 EMERGENCY DEPT VISIT HI MDM: CPT

## 2020-03-03 PROCEDURE — 96374 THER/PROPH/DIAG INJ IV PUSH: CPT

## 2020-03-03 PROCEDURE — 85025 COMPLETE CBC W/AUTO DIFF WBC: CPT

## 2020-03-03 PROCEDURE — 96361 HYDRATE IV INFUSION ADD-ON: CPT

## 2020-03-03 PROCEDURE — 80053 COMPREHEN METABOLIC PANEL: CPT

## 2020-03-03 PROCEDURE — 74177 CT ABD & PELVIS W/CONTRAST: CPT

## 2020-03-03 PROCEDURE — 36415 COLL VENOUS BLD VENIPUNCTURE: CPT

## 2020-03-03 NOTE — RAD
CT ABD PELV W/ IV CONTRST ONLY

 

History: Abdominal pain.

 

Technique:  After the administration of intravenous contrast, CT imaging 

was performed of the abdomen and pelvis.  Multiplanar images are reviewed.

 

 

Exposure: One or more of the following individualized dose reduction 

techniques were utilized for this examination:  

1. Automated exposure control  

2. Adjustment of the mA and/or kV according to patient size  

3. Use of iterative reconstruction technique.

 

Comparison:  August 4, 2014

 

Findings:

Lower chest: No consolidation or pleural effusion.

 

Abdomen and pelvis: Diffuse periportal edema, similar compared to prior. 

Pericholecystic fluid. No gallbladder wall thickening. The spleen, adrenal

glands, and pancreas are unremarkable. No biliary ductal dilatation. Small

left superior renal cyst measures 1.0 cm. No follow-up imaging is 

recommended per consensus recommendations based on imaging criteria. No 

hydronephrosis. Decompressed urinary bladder.

 

Normal appendix. No evidence of bowel obstruction. Diastases recti. No 

pathologic lymphadenopathy.

 

Heterogeneous appearance of the uterus. Involuting right ovarian follicle 

measures 1.1 cm. Small pelvic free fluid.

 

Bones: No pathologic osseous lesions.

 

Impression:

1.  Heterogeneous appearance of the uterus with involuting right ovarian 

follicle. Ultrasound can further evaluate if clinically indicated.

2.  Right pericholecystic fluid. No gallbladder wall thickening.

3.  Nonspecific periportal edema, similar compared to prior.

 

 

Electronically signed by: Filipe Trejo DO (3/3/2020 5:35 AM) [id number]

## 2020-03-03 NOTE — PHYS DOC
Past Medical History


Past Medical History:  Anemia, Ovarian Cyst, UTI


Additional Past Medical Histor:  OVARY CYST,CLOSED HEAD INJURY


Past Surgical History:  No Surgical History


Additional Past Surgical Histo:  COMPLICATIONS WITH VAGINAL BIRTH; stillborn at 

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


Smoking Status:  Current Every Day Smoker


Alcohol Use:  None


Drug Use:  None


Social History Narrative:  DENIES USE IN TRIAGE.





Adult General


Chief Complaint


Chief Complaint:  ABDOMINAL PAIN





HPI


HPI





Patient is a 29  year old female who presents with complaint of lower abdominal 

pain episode of nausea and vomiting. Patient indicates that she may be pregnant.

She denies any diarrhea. She rates pain to be a 7 out of 10. She describes pain 

as cramping in nature.[]





Review of Systems


Review of Systems





Constitutional: Denies fever or chills []


Respiratory: Denies cough or shortness of breath []


Cardiovascular: No additional information not addressed in HPI []


GI: Complains of lower abdominal pain with nausea and vomiting. Denies diarrhea 

[]


: Denies dysuria or hematuria []


Neurologic: Denies headache, focal weakness or sensory changes []





All other systems were reviewed and found to be within normal limits, except as 

documented in this note.





Current Medications


Current Medications





Current Medications








 Medications


  (Trade)  Dose


 Ordered  Sig/Luke  Start Time


 Stop Time Status Last Admin


Dose Admin


 


 Info


  (CONTRAST GIVEN


 -- Rx MONITORING)  1 each  PRN DAILY  PRN  3/3/20 04:45


 3/5/20 04:44   





 


 Iohexol


  (Omnipaque 300


 Mg/ml)  75 ml  1X  ONCE  3/3/20 05:00


 3/3/20 05:01 DC 3/3/20 05:05


75 ML


 


 Ondansetron HCl


  (Zofran)  4 mg  1X  ONCE  3/3/20 04:00


 3/3/20 04:01 DC 3/3/20 03:56


4 MG


 


 Sodium Chloride  1,000 ml @ 


 1,000 mls/hr  Q1H  3/3/20 04:00


 3/3/20 04:59 DC 3/3/20 03:56


1,000 MLS/HR











Allergies


Allergies





Allergies








Coded Allergies Type Severity Reaction Last Updated Verified


 


  radium-223 dichloride Allergy Intermediate RASH 8/29/14 Yes











Physical Exam


Physical Exam





Constitutional: Well developed, well nourished, no acute distress, non-toxic 

appearance. []


HENT: Normocephalic, atraumatic, bilateral external ears normal, oropharynx 

moist, no oral exudates, nose normal. []


Eyes: PERRLA, EOMI, conjunctiva normal, no discharge. [] 


Neck: Normal range of motion, no tenderness, supple, no stridor. [] 


Cardiovascular: Regular rate and rhythm[]


Lungs & Thorax:  Bilateral breath sounds clear to auscultation []


Abdomen: Bowel sounds normal, soft, no tenderness. [] 


Skin: Warm, dry, no erythema, no rash. [] 


Extremities: No tenderness, no cyanosis, no clubbing, ROM intact, no edema. [] 


Neurologic: Alert and oriented X 3, no focal deficits noted. []





Current Patient Data


Vital Signs





                                   Vital Signs








  Date Time  Temp Pulse Resp B/P (MAP) Pulse Ox O2 Delivery O2 Flow Rate FiO2


 


3/3/20 03:00 97.8 83 20 119/80 (93) 98 Room Air  





 97.8       








Lab Values





                                Laboratory Tests








Test


 3/3/20


03:00 3/3/20


03:11 3/3/20


03:36


 


Urine Collection Type Unknown    


 


Urine Color Yellow    


 


Urine Clarity Cloudy    


 


Urine pH 7.5    


 


Urine Specific Gravity 1.025    


 


Urine Protein


 Negative mg/dL


(NEG-TRACE) 


 





 


Urine Glucose (UA)


 Negative mg/dL


(NEG) 


 





 


Urine Ketones (Stick)


 Negative mg/dL


(NEG) 


 





 


Urine Blood


 Negative (NEG)


 


 





 


Urine Nitrite


 Negative (NEG)


 


 





 


Urine Bilirubin


 Negative (NEG)


 


 





 


Urine Urobilinogen Dipstick


 1.0 mg/dL (0.2


mg/dL) 


 





 


Urine Leukocyte Esterase


 Negative (NEG)


 


 





 


Urine RBC 0 /HPF (0-2)    


 


Urine WBC


 1-4 /HPF (0-4)


 


 





 


Urine Squamous Epithelial


Cells Few /LPF  


 


 





 


Urine Amorphous Sediment Present /HPF    


 


Urine Bacteria


 Few /HPF


(0-FEW) 


 





 


Urine Mucus Mod /LPF    


 


POC Urine HCG, Qualitative


 


 Hcg negative


(Negative) 





 


White Blood Count


 


 


 8.4 x10^3/uL


(4.0-11.0)


 


Red Blood Count


 


 


 3.83 x10^6/uL


(3.50-5.40)


 


Hemoglobin


 


 


 8.2 g/dL


(12.0-15.5)  L


 


Hematocrit


 


 


 26.8 %


(36.0-47.0)  L


 


Mean Corpuscular Volume


 


 


 70 fL ()


L


 


Mean Corpuscular Hemoglobin


 


 


 22 pg (25-35)


L


 


Mean Corpuscular Hemoglobin


Concent 


 


 31 g/dL


(31-37)


 


Red Cell Distribution Width


 


 


 22.2 %


(11.5-14.5)  H


 


Platelet Count


 


 


 229 x10^3/uL


(140-400)


 


Neutrophils (%) (Auto)   66 % (31-73)  


 


Lymphocytes (%) (Auto)   26 % (24-48)  


 


Monocytes (%) (Auto)   7 % (0-9)  


 


Eosinophils (%) (Auto)   1 % (0-3)  


 


Basophils (%) (Auto)   0 % (0-3)  


 


Neutrophils # (Auto)


 


 


 5.6 x10^3/uL


(1.8-7.7)


 


Lymphocytes # (Auto)


 


 


 2.2 x10^3/uL


(1.0-4.8)


 


Monocytes # (Auto)


 


 


 0.6 x10^3/uL


(0.0-1.1)


 


Eosinophils # (Auto)


 


 


 0.0 x10^3/uL


(0.0-0.7)


 


Basophils # (Auto)


 


 


 0.0 x10^3/uL


(0.0-0.2)


 


Platelet Estimate


 


 


 Adequate


(ADEQUATE)


 


Polychromasia   Slight  


 


Hypochromasia   Mod  


 


Poikilocytosis   Slight  


 


Anisocytosis   Mod  


 


Microcytosis   Marked  


 


Target Cells   Occ  


 


Ovalocytes   Occ  


 


Schistocytes     


 


Sodium Level


 


 


 142 mmol/L


(136-145)


 


Potassium Level


 


 


 3.4 mmol/L


(3.5-5.1)  L


 


Chloride Level


 


 


 106 mmol/L


()


 


Carbon Dioxide Level


 


 


 28 mmol/L


(21-32)


 


Anion Gap   8 (6-14)  


 


Blood Urea Nitrogen


 


 


 8 mg/dL (7-20)





 


Creatinine


 


 


 0.7 mg/dL


(0.6-1.0)


 


Estimated GFR


(Cockcroft-Gault) 


 


 118.1  





 


BUN/Creatinine Ratio   11 (6-20)  


 


Glucose Level


 


 


 105 mg/dL


(70-99)  H


 


Calcium Level


 


 


 8.6 mg/dL


(8.5-10.1)


 


Total Bilirubin


 


 


 0.1 mg/dL


(0.2-1.0)  L


 


Aspartate Amino Transferase


(AST) 


 


 20 U/L (15-37)





 


Alanine Aminotransferase (ALT)


 


 


 21 U/L (14-59)





 


Alkaline Phosphatase


 


 


 53 U/L


()


 


Total Protein


 


 


 7.0 g/dL


(6.4-8.2)


 


Albumin


 


 


 3.6 g/dL


(3.4-5.0)


 


Albumin/Globulin Ratio   1.1 (1.0-1.7)  


 


Lipase


 


 


 79 U/L


()





                                Laboratory Tests


3/3/20 03:36








                                Laboratory Tests


3/3/20 03:36














EKG


EKG


[]





Radiology/Procedures


Radiology/Procedures


[]


Impressions:


CT ABD PELV W/ IV CONTRST ONLY


 


History: Abdominal pain.


 


Technique:  After the administration of intravenous contrast, CT imaging 


was performed of the abdomen and pelvis.  Multiplanar images are reviewed.


 


 


Exposure: One or more of the following individualized dose reduction 


techniques were utilized for this examination:  


1. Automated exposure control  


2. Adjustment of the mA and/or kV according to patient size  


3. Use of iterative reconstruction technique.


 


Comparison:  August 4, 2014


 


Findings:


Lower chest: No consolidation or pleural effusion.


 


Abdomen and pelvis: Diffuse periportal edema, similar compared to prior. 


Pericholecystic fluid. No gallbladder wall thickening. The spleen, adrenal


glands, and pancreas are unremarkable. No biliary ductal dilatation. Small


left superior renal cyst measures 1.0 cm. No follow-up imaging is 


recommended per consensus recommendations based on imaging criteria. No 


hydronephrosis. Decompressed urinary bladder.


 


Normal appendix. No evidence of bowel obstruction. Diastases recti. No 


pathologic lymphadenopathy.


 


Heterogeneous appearance of the uterus. Involuting right ovarian follicle 


measures 1.1 cm. Small pelvic free fluid.


 


Bones: No pathologic osseous lesions.


 


Impression:


1.  Heterogeneous appearance of the uterus with involuting right ovarian 


follicle. Ultrasound can further evaluate if clinically indicated.


2.  Right pericholecystic fluid. No gallbladder wall thickening.


3.  Nonspecific periportal edema, similar compared to prior.


 


 


Electronically signed by: Ed Nicholas DO (3/3/2020 5:35 AM) [id number]














DICTATED and SIGNED BY:     ED NICHOLAS DO


DATE:     03/03/20 0535








Course & Med Decision Making


Course & Med Decision Making


Pertinent Labs and Imaging studies reviewed. (See chart for details)





[]





Dragon Disclaimer


Dragon Disclaimer


This electronic medical record was generated, in whole or in part, using a voice

recognition dictation system.





Departure


Departure


Impression:  


   Primary Impression:  


   Lower abdominal pain


Disposition:  01 HOME, SELF-CARE


Condition:  STABLE


Referrals:  


NO PCP (PCP)


Patient Instructions:  Abdominal Pain


Scripts


Acetaminophen With Codeine (TYLENOL WITH CODEINE #3 TABLET) 1 Each Tablet


1 TAB PO PRN Q6HRS PRN for PAIN, #12 TAB


   Prov: RAMSEY GOVEA Jr., DO         3/3/20 


Ondansetron (ONDANSETRON ODT) 4 Mg Tab.rapdis


1 TAB PO PRN Q6-8HRS PRN for NAUSEA, #15 TAB


   Prov: RAMSEY GOVEA Jr. DO         3/3/20











RAMSEY GOVEA Jr. DO           Mar 3, 2020 04:36

## 2020-05-04 ENCOUNTER — HOSPITAL ENCOUNTER (EMERGENCY)
Dept: HOSPITAL 61 - ER | Age: 30
Discharge: LEFT BEFORE DISCHARGE | End: 2020-05-04
Payer: SELF-PAY

## 2020-05-04 VITALS
DIASTOLIC BLOOD PRESSURE: 58 MMHG | SYSTOLIC BLOOD PRESSURE: 96 MMHG | DIASTOLIC BLOOD PRESSURE: 58 MMHG | DIASTOLIC BLOOD PRESSURE: 58 MMHG | SYSTOLIC BLOOD PRESSURE: 96 MMHG | SYSTOLIC BLOOD PRESSURE: 96 MMHG

## 2020-05-04 VITALS — HEIGHT: 67 IN | WEIGHT: 154.32 LBS | BODY MASS INDEX: 24.22 KG/M2

## 2020-05-04 DIAGNOSIS — J45.909: ICD-10-CM

## 2020-05-04 DIAGNOSIS — R10.9: ICD-10-CM

## 2020-05-04 DIAGNOSIS — Z3A.01: ICD-10-CM

## 2020-05-04 DIAGNOSIS — Z88.8: ICD-10-CM

## 2020-05-04 DIAGNOSIS — O21.9: Primary | ICD-10-CM

## 2020-05-04 DIAGNOSIS — Z98.890: ICD-10-CM

## 2020-05-04 DIAGNOSIS — F17.200: ICD-10-CM

## 2020-05-04 DIAGNOSIS — R53.83: ICD-10-CM

## 2020-05-04 DIAGNOSIS — O99.331: ICD-10-CM

## 2020-05-04 LAB
APTT PPP: YELLOW S
BACTERIA #/AREA URNS HPF: (no result) /HPF
BILIRUB UR QL STRIP: NEGATIVE
FIBRINOGEN PPP-MCNC: CLEAR MG/DL
NITRITE UR QL STRIP: POSITIVE
PH UR STRIP: 7.5 [PH]
PROT UR STRIP-MCNC: NEGATIVE MG/DL
RBC #/AREA URNS HPF: 0 /HPF (ref 0–2)
SQUAMOUS #/AREA URNS LPF: (no result) /LPF
T VAGINALIS URNS QL MICRO: PRESENT
UROBILINOGEN UR-MCNC: 1 MG/DL
WBC #/AREA URNS HPF: (no result) /HPF (ref 0–4)

## 2020-05-04 PROCEDURE — 81001 URINALYSIS AUTO W/SCOPE: CPT

## 2020-05-04 PROCEDURE — 87086 URINE CULTURE/COLONY COUNT: CPT

## 2020-05-04 PROCEDURE — 81025 URINE PREGNANCY TEST: CPT

## 2020-05-04 PROCEDURE — 99284 EMERGENCY DEPT VISIT MOD MDM: CPT

## 2020-05-04 NOTE — PHYS DOC
Left message for patient to return my call regarding injections Orthovisc.Need new insurance information .   Past Medical History


Past Medical History:  Anemia, Asthma, Ovarian Cyst, UTI


Additional Past Medical Histor:  OVARY CYST,CLOSED HEAD INJURY


Past Surgical History:  No Surgical History


Additional Past Surgical Histo:  COMPLICATIONS WITH VAGINAL BIRTH; stillborn at 

23 weeks


Smoking Status:  Current Every Day Smoker


Alcohol Use:  None


Drug Use:  None





Adult General


Chief Complaint


Chief Complaint:  NAUSEA/VOMITING/DIARRHA





HPI


HPI





Patient is a 29  year old female who presents with multiple medical complaints. 

Patient reports generalized fatigue malaise and nausea the past 3 days vomiting 

this morning.  Reports prepubic abdominal pain.  No fever chills, flank pain, 

hematuria, urinary frequency urgency or dysuria.  No vaginal bleeding.  Last 

menstrual period was 3 weeks ago.  No other acute symptoms or complaints.  []





Review of Systems


Review of Systems


ROS as per HPI[]





All other systems were reviewed and found to be within normal limits, except as 

documented in this note.





Current Medications


Current Medications





Current Medications








 Medications


  (Trade)  Dose


 Ordered  Sig/Luke  Start Time


 Stop Time Status Last Admin


Dose Admin


 


 Azithromycin


  (Zithromax)  1,000 mg  1X  ONCE  5/4/20 10:30


 5/4/20 10:31 Cancel  





 


 Ceftriaxone Sodium


  (Rocephin Im)  250 mg  1X  ONCE  5/4/20 10:30


 5/4/20 10:31 Cancel  














Allergies


Allergies





Allergies








Coded Allergies Type Severity Reaction Last Updated Verified


 


  radium-223 dichloride Allergy Intermediate RASH 8/29/14 Yes











Physical Exam


Physical Exam





Constitutional: Well developed, well nourished, no acute distress, non-toxic 

appearance. []


HENT: Normocephalic, atraumatic, bilateral external ears normal, oropharynx 

moist, nose normal. []


Eyes: PERRLA, EOMI, conjunctiva normal. [] 


Neck: Normal range of motion, no tenderness, supple, no stridor. [] 


Cardiovascular:Heart rate regular rhythm, no murmur []


Lungs & Thorax:  Bilateral breath sounds clear to auscultation []


Abdomen: Bowel sounds normal, soft, no tenderness. [] 


Skin: Warm, dry, no erythema, no rash. [] 


Back: No tenderness. [] 


Extremities: No tenderness, no edema. [] 


Neurologic: Alert and oriented X 3, normal motor function, normal sensory 

function, no focal deficits noted. []


Psychologic: Affect normal, judgement normal, mood normal. []





Current Patient Data


Vital Signs





                                   Vital Signs








  Date Time  Temp Pulse Resp B/P (MAP) Pulse Ox O2 Delivery O2 Flow Rate FiO2


 


5/4/20 11:08  73 20 96/58 (71) 100 Room Air  


 


5/4/20 09:45 98.5       





 98.5       








Lab Values





                                Laboratory Tests








Test


 5/4/20


09:35 5/4/20


09:44


 


Urine Collection Type Unknown   


 


Urine Color Yellow   


 


Urine Clarity Clear   


 


Urine pH


 7.5 (<5.0-8.0)


 





 


Urine Specific Gravity


 1.020


(1.000-1.030) 





 


Urine Protein


 Negative mg/dL


(NEG-TRACE) 





 


Urine Glucose (UA)


 Negative mg/dL


(NEG) 





 


Urine Ketones (Stick)


 Negative mg/dL


(NEG) 





 


Urine Blood


 Negative (NEG)


 





 


Urine Nitrite


 Positive (NEG)


 





 


Urine Bilirubin


 Negative (NEG)


 





 


Urine Urobilinogen Dipstick


 1.0 mg/dL (0.2


mg/dL) 





 


Urine Leukocyte Esterase Small (NEG)   


 


Urine RBC 0 /HPF (0-2)   


 


Urine WBC


 5-10 /HPF


(0-4) 





 


Urine Squamous Epithelial


Cells Mod /LPF  


 





 


Urine Bacteria


 Many /HPF


(0-FEW) 





 


Urine Trichomonas Present   


 


POC Urine HCG, Qualitative


 


 Hcg positive


(Negative)











EKG


EKG


[]





Radiology/Procedures


Radiology/Procedures


[]





Course & Med Decision Making


Course & Med Decision Making


Pertinent Labs and Imaging studies reviewed. (See chart for details)





HCG positive]





Dragon Disclaimer


Dragon Disclaimer


This electronic medical record was generated, in whole or in part, using a voice

recognition dictation system.





Departure


Departure


Impression:  


   Primary Impression:  


   Left against medical advice


   Additional Impression:  


   Pregnancy


Disposition:  01 HOME, SELF-CARE


Condition:  STABLE





Problem Qualifiers











IVETH WASSERMAN DO                    May 4, 2020 12:48

## 2020-07-08 ENCOUNTER — HOSPITAL ENCOUNTER (OUTPATIENT)
Dept: HOSPITAL 61 - 3 SO LND | Age: 30
Setting detail: OBSERVATION
Discharge: HOME | End: 2020-07-08
Attending: OBSTETRICS & GYNECOLOGY | Admitting: OBSTETRICS & GYNECOLOGY
Payer: COMMERCIAL

## 2020-07-08 DIAGNOSIS — M54.9: ICD-10-CM

## 2020-07-08 DIAGNOSIS — O99.89: Primary | ICD-10-CM

## 2020-07-08 DIAGNOSIS — Z79.899: ICD-10-CM

## 2020-07-08 DIAGNOSIS — R10.30: ICD-10-CM

## 2020-07-08 DIAGNOSIS — Z3A.18: ICD-10-CM

## 2020-07-08 DIAGNOSIS — O26.892: ICD-10-CM

## 2020-07-08 LAB
AMPHETAMINE/METHAMPHETAMINE: (no result)
APTT PPP: YELLOW S
BACTERIA #/AREA URNS HPF: (no result) /HPF
BARBITURATES UR-MCNC: (no result) UG/ML
BENZODIAZ UR-MCNC: (no result) UG/L
BILIRUB UR QL STRIP: NEGATIVE
CANNABINOIDS UR-MCNC: (no result) UG/L
COCAINE UR-MCNC: (no result) NG/ML
FIBRINOGEN PPP-MCNC: CLEAR MG/DL
METHADONE SERPL-MCNC: (no result) NG/ML
NITRITE UR QL STRIP: NEGATIVE
OPIATES UR-MCNC: (no result) NG/ML
PCP SERPL-MCNC: (no result) MG/DL
PH UR STRIP: 7.5 [PH]
PROT UR STRIP-MCNC: NEGATIVE MG/DL
RBC #/AREA URNS HPF: 0 /HPF (ref 0–2)
SQUAMOUS #/AREA URNS LPF: (no result) /LPF
T VAGINALIS URNS QL MICRO: PRESENT
UROBILINOGEN UR-MCNC: 1 MG/DL
WBC #/AREA URNS HPF: (no result) /HPF (ref 0–4)

## 2020-07-08 PROCEDURE — 76815 OB US LIMITED FETUS(S): CPT

## 2020-07-08 PROCEDURE — G0378 HOSPITAL OBSERVATION PER HR: HCPCS

## 2020-07-08 PROCEDURE — 87086 URINE CULTURE/COLONY COUNT: CPT

## 2020-07-08 PROCEDURE — 81001 URINALYSIS AUTO W/SCOPE: CPT

## 2020-07-08 PROCEDURE — G0379 DIRECT REFER HOSPITAL OBSERV: HCPCS

## 2020-07-08 PROCEDURE — 80307 DRUG TEST PRSMV CHEM ANLYZR: CPT

## 2020-07-08 NOTE — RAD
Study: US OB LIMITED

 

Indication: Severe back pain.

 

Comparison: None recently. 

 

Technique: Limited ultrasound assessment of the pelvis with a 

transabdominal probe.

 

Findings:

 

Single live intrauterine pregnancy in breech presentation. Fetal heart 

rate of 175 bpm.

 

Biparietal diameter: 3.8 cm-17 weeks 6 days

Head circumference: 14.60 cm-17 weeks 6 days

Abdominal circumference: 12.66 cm-18 weeks 2 days

Femur length: 2.1 cm-18 weeks 4 days. 

 

Estimated fetal weight of 235 g. Estimated gestational age by sonography 

of 18 weeks 1 day corresponding to a estimated delivery date of 12/8/2020.

 

Amniotic fluid volume is appropriate. The lower margin of the placenta 

does not appear to extend over the internal cervical os. A fetal 

assessment was not performed on this limited study.

 

Impression:

 

Single live intrauterine gestation with an estimated age of 18 weeks 1 day

corresponding to a delivery date of 12/8/2020. No complicating features 

seen on this limited exam.

 

Electronically signed by: BREANNE BRAVO MD (7/8/2020 10:18 PM) [id number]

## 2020-09-06 ENCOUNTER — HOSPITAL ENCOUNTER (OUTPATIENT)
Dept: HOSPITAL 61 - 3 SO LND | Age: 30
Setting detail: OBSERVATION
Discharge: HOME | End: 2020-09-06
Attending: OBSTETRICS & GYNECOLOGY | Admitting: OBSTETRICS & GYNECOLOGY
Payer: COMMERCIAL

## 2020-09-06 DIAGNOSIS — Z3A.26: ICD-10-CM

## 2020-09-06 DIAGNOSIS — R10.9: ICD-10-CM

## 2020-09-06 DIAGNOSIS — O26.892: Primary | ICD-10-CM

## 2020-09-06 LAB
AMPHETAMINE/METHAMPHETAMINE: (no result)
APTT PPP: YELLOW S
BACTERIA #/AREA URNS HPF: (no result) /HPF
BARBITURATES UR-MCNC: (no result) UG/ML
BENZODIAZ UR-MCNC: (no result) UG/L
BILIRUB UR QL STRIP: NEGATIVE
CANNABINOIDS UR-MCNC: (no result) UG/L
COCAINE UR-MCNC: (no result) NG/ML
FIBRINOGEN PPP-MCNC: CLEAR MG/DL
METHADONE SERPL-MCNC: (no result) NG/ML
NITRITE UR QL STRIP: NEGATIVE
OPIATES UR-MCNC: (no result) NG/ML
PCP SERPL-MCNC: (no result) MG/DL
PH UR STRIP: 7.5 [PH]
PROT UR STRIP-MCNC: NEGATIVE MG/DL
RBC #/AREA URNS HPF: (no result) /HPF (ref 0–2)
SQUAMOUS #/AREA URNS LPF: (no result) /LPF
UROBILINOGEN UR-MCNC: 1 MG/DL
WBC #/AREA URNS HPF: (no result) /HPF (ref 0–4)

## 2020-09-06 PROCEDURE — G0378 HOSPITAL OBSERVATION PER HR: HCPCS

## 2020-09-06 PROCEDURE — 87086 URINE CULTURE/COLONY COUNT: CPT

## 2020-09-06 PROCEDURE — 81001 URINALYSIS AUTO W/SCOPE: CPT

## 2020-09-06 PROCEDURE — G0379 DIRECT REFER HOSPITAL OBSERV: HCPCS

## 2020-09-06 PROCEDURE — 80307 DRUG TEST PRSMV CHEM ANLYZR: CPT
